# Patient Record
(demographics unavailable — no encounter records)

---

## 2025-01-06 NOTE — HISTORY OF PRESENT ILLNESS
[FreeTextEntry1] : Communication took place through a [facility]  73-year-old man with GERD.  He is doing well on Omeprazole 20 mg daily.  He has no further dysphagia.  He underwent an EGD and biopsy on October 23 that showed a small hiatus hernia but was otherwise unremarkable.  Continues to complain of chronic constipation which she is taking Benefiber and Senokot tabs.  He denies rectal bleeding, melena or hematemesis.  He again states that he had a screening colonoscopy normal colonoscopy 2 years ago.  However, the report is not available.

## 2025-01-06 NOTE — ASSESSMENT
[FreeTextEntry1] : GERD. Dietary and lifestyle modification discussed with the patient.  Continue Omeprazole 20 mg daily. Chronic constipation.   Dietary and lifestyle modification discussed with the patient.  Add MiraLAX daily in AM.  This was discussed with the patient via .  He understands and all questions were answered. Colon cancer screening.  Cologuard testing ordered. Today's care provided as part of continued longitudinal relationship to serve patient's ongoing need for healthcare services.

## 2025-01-06 NOTE — REASON FOR VISIT
[Follow-up] : a follow-up of an existing diagnosis [[facility] ] : provided by [facility]   [Time Spent: ____ minutes] : Total time spent using  services: [unfilled] minutes. The patient's primary language is not English thus required  services. [FreeTextEntry1] : GERD [Interpreters_IDNumber] : [id number] [Interpreters_FullName] : Silva [TWNoteComboBox1] : Cuca

## 2025-05-19 NOTE — HISTORY OF PRESENT ILLNESS
[FreeTextEntry1] : 74-year-old man continues complain of constipation.  He has been taking GlycoLax with minimum response.  He now thinks he had a colonoscopy for 5 years ago.  The results are not available.  He denies rectal bleeding, melena or hematemesis.  He feels his stool habits of change in the bowel movement are thinner

## 2025-05-19 NOTE — PHYSICAL EXAM
[Alert] : alert [Normal Voice/Communication] : normal voice/communication [Healthy Appearing] : healthy appearing [No Acute Distress] : no acute distress [Sclera] : the sclera and conjunctiva were normal [Hearing Threshold Finger Rub Not [place]] : hearing was normal [Normal Lips/Gums] : the lips and gums were normal [Oropharynx] : the oropharynx was normal [Normal Appearance] : the appearance of the neck was normal [No Neck Mass] : no neck mass was observed [No Respiratory Distress] : no respiratory distress [No Acc Muscle Use] : no accessory muscle use [Respiration, Rhythm And Depth] : normal respiratory rhythm and effort [Auscultation Breath Sounds / Voice Sounds] : lungs were clear to auscultation bilaterally [Heart Rate And Rhythm] : heart rate was normal and rhythm regular [Normal S1, S2] : normal S1 and S2 [Murmurs] : no murmurs [Bowel Sounds] : normal bowel sounds [Abdomen Tenderness] : non-tender [No Masses] : no abdominal mass palpated [Abdomen Soft] : soft [] : no hepatosplenomegaly [Oriented To Time, Place, And Person] : oriented to person, place, and time

## 2025-05-19 NOTE — ASSESSMENT
[FreeTextEntry1] : Irritable bowel syndrome with constipation. Dietary and lifestyle modification discussed with the patient.  Continue GlycoLax may need to switch to other forms of treatment pending results of colonoscopy. Screening colonoscopy.  Importance of colon cancer screening and the colonoscopy prep was discussed with the patient.  He understands and all questions were answered.

## 2025-07-14 NOTE — HISTORY OF PRESENT ILLNESS
[FreeTextEntry1] : 74-year-old man underwent a screening colonoscopy on 627.  2 small polyps removed from the ascending and descending colon.  Pathology was tubular adenoma.  He tolerated the procedure well.  He is no longer complaining of chronic constipation.

## 2025-07-14 NOTE — ASSESSMENT
[FreeTextEntry1] : Colon cancer screening.  Adenomatous polyp of the colon.  Patient will return in 3 years for surveillance colonoscopy.  This was discussed with the patient.  He understands all questions were answered.